# Patient Record
Sex: MALE | Race: WHITE | Employment: OTHER | URBAN - METROPOLITAN AREA
[De-identification: names, ages, dates, MRNs, and addresses within clinical notes are randomized per-mention and may not be internally consistent; named-entity substitution may affect disease eponyms.]

---

## 2018-10-09 ENCOUNTER — CORNEA CONSULT (OUTPATIENT)
Dept: URBAN - METROPOLITAN AREA CLINIC 39 | Facility: CLINIC | Age: 68
End: 2018-10-09

## 2018-10-09 VITALS — HEART RATE: 76 BPM | HEIGHT: 60 IN | SYSTOLIC BLOOD PRESSURE: 140 MMHG | DIASTOLIC BLOOD PRESSURE: 90 MMHG

## 2018-10-09 DIAGNOSIS — H18.613: ICD-10-CM

## 2018-10-09 DIAGNOSIS — Z98.890: ICD-10-CM

## 2018-10-09 PROCEDURE — G8483 FLU IMM NO ADMIN DOC REA: HCPCS

## 2018-10-09 PROCEDURE — 99204 OFFICE O/P NEW MOD 45 MIN: CPT

## 2018-10-09 PROCEDURE — 1036F TOBACCO NON-USER: CPT

## 2018-10-09 PROCEDURE — G8952 PRE-HTN/HTN, NO F/U, NOT GVN: HCPCS

## 2018-10-09 PROCEDURE — 92134 CPTRZ OPH DX IMG PST SGM RTA: CPT

## 2018-10-09 PROCEDURE — G8427 DOCREV CUR MEDS BY ELIG CLIN: HCPCS

## 2018-10-09 PROCEDURE — 92025 CPTRIZED CORNEAL TOPOGRAPHY: CPT

## 2018-10-09 PROCEDURE — 4040F PNEUMOC VAC/ADMIN/RCVD: CPT

## 2018-10-09 PROCEDURE — 92015 DETERMINE REFRACTIVE STATE: CPT

## 2018-10-09 PROCEDURE — 92286 ANT SGM IMG I&R SPECLR MIC: CPT

## 2018-10-09 PROCEDURE — G9903 PT SCRN TBCO ID AS NON USER: HCPCS

## 2018-10-09 ASSESSMENT — VISUAL ACUITY
OD_SC: <J12
OS_SC: 20/400
OS_SC: J10
OU_CC: 20/40-1
OD_CC: J6
OS_CC: J10
OD_CC: 20/60
OS_CC: 20/50+2
OD_SC: CF 3FT

## 2018-10-09 ASSESSMENT — TONOMETRY
OS_IOP_MMHG: 9
OD_IOP_MMHG: 9

## 2019-03-13 NOTE — PATIENT DISCUSSION
EPIRETINAL MEMBRANE, OS&gt;OD. STABLE NOT VISUALLY SIGNIFICANT TO THE PATIENT AT THIS TIME. FOLLOW.  IF [facility]FFECTS [facility] WILL RE[facility] TO DR Carla Beckford

## 2019-03-13 NOTE — PATIENT DISCUSSION
MILD NONPROLIFERATIVE DIABETIC RETINOPATHY: RETURN FOR FOLLOW-UP AS SCHEDULED FOR DILATED FUNDUS EXAM.

## 2019-03-13 NOTE — PATIENT DISCUSSION
EXPOSURE KERATOPATHY: PRESCRIBED PRESERVATIVE FREE ARTIFICIAL TEARS 4-6X A DAY, OU AND THE DAILY INTAKE OF OMEGA-3 DHA/EPA FATTY ACIDS TO HELP RELIEVE SYMPTOMS. ADD NIGHTLY LUBRICATING OINTMENT OR GEL. RETURN FOR FOLLOW-UP AS SCHEDULED OR SOONER IF SYMPTOMS WORSEN.

## 2021-04-30 ASSESSMENT — KERATOMETRY
OS_AXISANGLE_DEGREES: 80
OD_AXISANGLE2_DEGREES: 155
OD_K1POWER_DIOPTERS: 56
OS_K1POWER_DIOPTERS: 52
OS_K2POWER_DIOPTERS: 55
OS_AXISANGLE2_DEGREES: 170
OD_K2POWER_DIOPTERS: 57
OD_AXISANGLE_DEGREES: 65

## 2021-12-08 ENCOUNTER — CONSULTATION/EVALUATION (OUTPATIENT)
Dept: URBAN - METROPOLITAN AREA CLINIC 43 | Facility: CLINIC | Age: 71
End: 2021-12-08

## 2021-12-08 DIAGNOSIS — H18.623: ICD-10-CM

## 2021-12-08 DIAGNOSIS — Z98.890: ICD-10-CM

## 2021-12-08 DIAGNOSIS — H04.123: ICD-10-CM

## 2021-12-08 PROCEDURE — 92250 FUNDUS PHOTOGRAPHY W/I&R: CPT

## 2021-12-08 PROCEDURE — 92286 ANT SGM IMG I&R SPECLR MIC: CPT

## 2021-12-08 PROCEDURE — 92025 CPTRIZED CORNEAL TOPOGRAPHY: CPT

## 2021-12-08 PROCEDURE — 99204 OFFICE O/P NEW MOD 45 MIN: CPT

## 2021-12-08 PROCEDURE — 92134 CPTRZ OPH DX IMG PST SGM RTA: CPT

## 2021-12-08 RX ORDER — OLOPATADINE HYDROCHLORIDE 2 MG/ML: 1 SOLUTION OPHTHALMIC ONCE A DAY

## 2021-12-08 RX ORDER — MOXIFLOXACIN HYDROCHLORIDE 5 MG/ML: 1 SOLUTION/ DROPS OPHTHALMIC

## 2021-12-08 RX ORDER — FLUOROMETHOLONE ACETATE 1 MG/ML: 1 SUSPENSION/ DROPS OPHTHALMIC

## 2021-12-08 ASSESSMENT — VISUAL ACUITY
OS_SC: CF 6FT
OD_SC: >J12
OD_SC: CF 4FT
OS_CC: 20/60+1
OD_CC: 20/70
OD_CC: J12
OD_BAT: 20/50-2
OS_BAT: 20/50+2
OS_SC: >J12
OS_CC: J12

## 2021-12-08 ASSESSMENT — TONOMETRY
OD_IOP_MMHG: 08
OS_IOP_MMHG: 09

## 2021-12-08 ASSESSMENT — PACHYMETRY
OD_CT_UM: 401
OS_CT_UM: 403

## 2022-10-14 NOTE — PATIENT DISCUSSION
06/16/2020OS-3.00+[id number]/50 -&nbsp;SN &nbsp; &nbsp; bmb
COUNSELING:
Comments:any combo
Dry Eye Syndrome Counseling: I have discussed the diagnosis and the pathophysiology of this disease with the patient. Eyelid pathology and systemic illnesses such as Sjogren&rsquo;s disease or rheumatoid arthritis may contribute to severity. Vision may be limited by dry eye, and symptoms exacerbated by environmental factors such as smoke, wind, or prolonged eye use. Lifestyle habits and environmental factors contributing to dry eyes have been reviewed with the patient. Daily prescribed and over the counter medications, along with their potential contributions to dry eye symptoms, have been discussed. Treatment options include, but are not limited to, artificial tears, punctal plugs, topical cyclosporine, oral omega-3 supplements, Lipiflow, moisture goggles, and lubricating ointments. I stressed the importance of compliance with treatment.
EPIRETINAL MEMBRANE, OS&gt;OD. STABLE NOT VISUALLY SIGNIFICANT TO THE PATIENT AT THIS TIME. FOLLOW.  IF IT [facility]FFECTS [facility] WILL RE[facility] TO DR Dave Moreira
EXPOSURE KERATOPATHY: PRESCRIBED PRESERVATIVE FREE ARTIFICIAL TEARS 4-6X A DAY, OU AND THE DAILY INTAKE OF OMEGA-3 DHA/EPA FATTY ACIDS TO HELP RELIEVE SYMPTOMS. ADD NIGHTLY LUBRICATING OINTMENT OR GEL. RETURN FOR FOLLOW-UP AS SCHEDULED OR SOONER IF SYMPTOMS WORSEN.
Epiretinal Membrane Counseling: The diagnosis and natural history of epiretinal membrane was discussed with the patient including the risk of progression with retinal traction resulting in visual distortion. The patient is instructed to call back immediately if any vision changes, and keep follow up as scheduled.
General:
Lens Material:
Lens Treatment:
MILD NONPROLIFERATIVE DIABETIC RETINOPATHY: RETURN FOR FOLLOW-UP AS SCHEDULED FOR DILATED FUNDUS EXAM.
Non Proliferative Diabetic Counseling: I have discussed with the patient the importance of controlling blood glucose, blood pressure and lipid levels to minimize the risk of progressing retinal complications from diabetes. I explained the importance of annual dilated eye exams because effective treatment of retinal complications depends on timely intervention. The patient was instructed to call or return sooner if they noticed blurred or distorted vision, fluctuating vision, pain or redness around one or both eyes. Return for follow-up as scheduled.
Progressive - Daily wearOD-2.00+1.5019+[id number]/40 -2&nbsp;SN &nbsp; &nbsp; bmb
Slab Off:No
UV Protection
Vertex Distance:
spherecylinderaxisaddprismvertexVAInt VANVExaminer
RLE pain

## 2022-11-02 ENCOUNTER — CONSULTATION/EVALUATION (OUTPATIENT)
Dept: URBAN - METROPOLITAN AREA CLINIC 43 | Facility: CLINIC | Age: 72
End: 2022-11-02

## 2022-11-02 DIAGNOSIS — H18.623: ICD-10-CM

## 2022-11-02 DIAGNOSIS — H04.123: ICD-10-CM

## 2022-11-02 PROCEDURE — 99215 OFFICE O/P EST HI 40 MIN: CPT

## 2022-11-02 PROCEDURE — 92025-1 CORNEAL TOPOGRAPHY, INS

## 2022-11-02 ASSESSMENT — VISUAL ACUITY
OD_SC: CF 3FT
OD_PH: 20/200
OS_SC: <J10
OS_PH: 20/80
OD_SC: <J10
OS_SC: CF 5FT

## 2022-11-02 ASSESSMENT — KERATOMETRY
OD_AXISANGLE2_DEGREES: 155
OS_K1POWER_DIOPTERS: 52
OS_K2POWER_DIOPTERS: 55
OS_AXISANGLE_DEGREES: 80
OD_K2POWER_DIOPTERS: 57
OS_AXISANGLE2_DEGREES: 170
OD_K1POWER_DIOPTERS: 56
OD_AXISANGLE_DEGREES: 65

## 2022-11-02 ASSESSMENT — TONOMETRY
OS_IOP_MMHG: 08
OD_IOP_MMHG: 07

## 2023-01-19 ENCOUNTER — CLINIC PROCEDURE ONLY (OUTPATIENT)
Dept: URBAN - METROPOLITAN AREA CLINIC 39 | Facility: CLINIC | Age: 73
End: 2023-01-19

## 2023-01-19 DIAGNOSIS — H04.123: ICD-10-CM

## 2023-01-19 DIAGNOSIS — H18.623: ICD-10-CM

## 2023-01-19 DIAGNOSIS — H18.713: ICD-10-CM

## 2023-01-19 PROCEDURE — 99211T TECH SERVICE

## 2023-01-19 PROCEDURE — 0402T COLGN CRS-LINK CRN&PACHYMTRY: CPT

## 2023-01-19 ASSESSMENT — KERATOMETRY
OD_K1POWER_DIOPTERS: 56
OS_AXISANGLE2_DEGREES: 170
OD_AXISANGLE2_DEGREES: 155
OS_K2POWER_DIOPTERS: 55
OD_K2POWER_DIOPTERS: 57
OS_AXISANGLE_DEGREES: 80
OS_K1POWER_DIOPTERS: 52
OD_AXISANGLE_DEGREES: 65

## 2023-01-26 ENCOUNTER — POST-OP (OUTPATIENT)
Dept: URBAN - METROPOLITAN AREA CLINIC 39 | Facility: CLINIC | Age: 73
End: 2023-01-26

## 2023-01-26 DIAGNOSIS — H18.713: ICD-10-CM

## 2023-01-26 DIAGNOSIS — Z98.890: ICD-10-CM

## 2023-01-26 DIAGNOSIS — H04.123: ICD-10-CM

## 2023-01-26 DIAGNOSIS — H18.623: ICD-10-CM

## 2023-01-26 PROCEDURE — 99212 OFFICE O/P EST SF 10 MIN: CPT

## 2023-01-26 ASSESSMENT — KERATOMETRY
OD_AXISANGLE_DEGREES: 65
OS_K2POWER_DIOPTERS: 55
OS_AXISANGLE2_DEGREES: 170
OD_AXISANGLE2_DEGREES: 155
OS_AXISANGLE_DEGREES: 80
OS_K1POWER_DIOPTERS: 52
OD_K2POWER_DIOPTERS: 57
OD_K1POWER_DIOPTERS: 56

## 2023-01-26 ASSESSMENT — VISUAL ACUITY
OD_PH: 20/200
OS_PH: 20/40+2
OD_SC: 20/400
OS_SC: 20/70-2
OU_SC: 20/60

## 2023-05-26 ENCOUNTER — TECH ONLY (OUTPATIENT)
Dept: URBAN - METROPOLITAN AREA CLINIC 39 | Facility: CLINIC | Age: 73
End: 2023-05-26

## 2023-05-26 DIAGNOSIS — H18.623: ICD-10-CM

## 2023-05-26 DIAGNOSIS — H04.123: ICD-10-CM

## 2023-05-26 DIAGNOSIS — Z98.890: ICD-10-CM

## 2023-05-26 DIAGNOSIS — H18.713: ICD-10-CM

## 2023-05-26 PROCEDURE — 99211T TECH SERVICE

## 2023-05-26 ASSESSMENT — KERATOMETRY
OS_AXISANGLE2_DEGREES: 170
OD_AXISANGLE2_DEGREES: 155
OD_AXISANGLE_DEGREES: 65
OD_K2POWER_DIOPTERS: 57
OS_K2POWER_DIOPTERS: 55
OD_K1POWER_DIOPTERS: 56
OS_AXISANGLE_DEGREES: 80
OS_K1POWER_DIOPTERS: 52

## 2023-06-28 ASSESSMENT — KERATOMETRY
OS_K2POWER_DIOPTERS: 55
OS_AXISANGLE_DEGREES: 80
OS_K1POWER_DIOPTERS: 52
OD_K2POWER_DIOPTERS: 57
OS_AXISANGLE2_DEGREES: 170
OD_AXISANGLE2_DEGREES: 155
OD_K1POWER_DIOPTERS: 56
OD_AXISANGLE_DEGREES: 65

## 2023-06-30 ENCOUNTER — CONSULTATION/EVALUATION (OUTPATIENT)
Dept: URBAN - METROPOLITAN AREA CLINIC 39 | Facility: CLINIC | Age: 73
End: 2023-06-30

## 2023-06-30 DIAGNOSIS — H18.713: ICD-10-CM

## 2023-06-30 DIAGNOSIS — H04.123: ICD-10-CM

## 2023-06-30 DIAGNOSIS — H25.811: ICD-10-CM

## 2023-06-30 DIAGNOSIS — H25.812: ICD-10-CM

## 2023-06-30 DIAGNOSIS — Z98.890: ICD-10-CM

## 2023-06-30 DIAGNOSIS — H18.623: ICD-10-CM

## 2023-06-30 PROCEDURE — 99214 OFFICE O/P EST MOD 30 MIN: CPT

## 2023-06-30 PROCEDURE — 92025 CPTRIZED CORNEAL TOPOGRAPHY: CPT

## 2023-06-30 PROCEDURE — 92136TC INTERFEROMETRY - TECHNICAL COMPONENT

## 2023-06-30 PROCEDURE — V2799PMN IMPRIMIS PRED-MOXI-NEPAF 5ML

## 2023-06-30 ASSESSMENT — VISUAL ACUITY
OD_SC: CF 2FT
OS_SC: CF 2FT

## 2023-06-30 ASSESSMENT — TONOMETRY
OS_IOP_MMHG: 13
OD_IOP_MMHG: 13

## 2023-08-29 ASSESSMENT — KERATOMETRY
OD_K2POWER_DIOPTERS: 57
OD_AXISANGLE2_DEGREES: 155
OS_K2POWER_DIOPTERS: 55
OD_K1POWER_DIOPTERS: 56
OS_AXISANGLE_DEGREES: 80
OS_K1POWER_DIOPTERS: 52
OD_AXISANGLE_DEGREES: 65
OS_AXISANGLE2_DEGREES: 170

## 2023-08-30 ENCOUNTER — PRE-OP/H&P (OUTPATIENT)
Dept: URBAN - METROPOLITAN AREA SURGERY 14 | Facility: SURGERY | Age: 73
End: 2023-08-30

## 2023-08-30 ENCOUNTER — SURGERY/PROCEDURE (OUTPATIENT)
Dept: URBAN - METROPOLITAN AREA CLINIC 39 | Facility: CLINIC | Age: 73
End: 2023-08-30

## 2023-08-30 DIAGNOSIS — H25.811: ICD-10-CM

## 2023-08-30 DIAGNOSIS — H25.89: ICD-10-CM

## 2023-08-30 DIAGNOSIS — H18.713: ICD-10-CM

## 2023-08-30 DIAGNOSIS — H04.123: ICD-10-CM

## 2023-08-30 DIAGNOSIS — H18.623: ICD-10-CM

## 2023-08-30 DIAGNOSIS — H21.81: ICD-10-CM

## 2023-08-30 DIAGNOSIS — H25.812: ICD-10-CM

## 2023-08-30 PROCEDURE — 99211HP PRE-OP

## 2023-08-30 PROCEDURE — 6698254 COMPLEX CATARACT (SX ONLY)

## 2023-08-31 ASSESSMENT — KERATOMETRY
OS_AXISANGLE2_DEGREES: 170
OD_AXISANGLE_DEGREES: 65
OS_AXISANGLE_DEGREES: 80
OD_K1POWER_DIOPTERS: 56
OD_AXISANGLE2_DEGREES: 155
OS_K2POWER_DIOPTERS: 55
OD_K2POWER_DIOPTERS: 57
OS_K1POWER_DIOPTERS: 52

## 2023-09-13 ENCOUNTER — SURGERY/PROCEDURE (OUTPATIENT)
Dept: URBAN - METROPOLITAN AREA CLINIC 39 | Facility: CLINIC | Age: 73
End: 2023-09-13

## 2023-09-13 DIAGNOSIS — H25.812: ICD-10-CM

## 2023-09-13 DIAGNOSIS — H21.81: ICD-10-CM

## 2023-09-13 PROCEDURE — 6698254 COMPLEX CATARACT (SX ONLY)

## 2023-09-18 ASSESSMENT — KERATOMETRY
OS_AXISANGLE_DEGREES: 80
OD_K1POWER_DIOPTERS: 56
OD_AXISANGLE2_DEGREES: 155
OS_K2POWER_DIOPTERS: 55
OD_AXISANGLE_DEGREES: 65
OS_K1POWER_DIOPTERS: 52
OD_K2POWER_DIOPTERS: 57
OS_AXISANGLE2_DEGREES: 170